# Patient Record
Sex: MALE | Race: WHITE | URBAN - METROPOLITAN AREA
[De-identification: names, ages, dates, MRNs, and addresses within clinical notes are randomized per-mention and may not be internally consistent; named-entity substitution may affect disease eponyms.]

---

## 2020-01-06 ENCOUNTER — OFFICE VISIT (OUTPATIENT)
Dept: URBAN - METROPOLITAN AREA CLINIC 52 | Facility: CLINIC | Age: 70
End: 2020-01-06
Payer: MEDICARE

## 2020-01-06 DIAGNOSIS — H11.002 PTERYGIUM OF LT EYE: ICD-10-CM

## 2020-01-06 DIAGNOSIS — H25.013 CORTICAL AGE-RELATED CATARACT, BILATERAL: ICD-10-CM

## 2020-01-06 DIAGNOSIS — E11.9 TYPE 2 DIABETES MELLITUS WITHOUT COMPLICATIONS: Primary | ICD-10-CM

## 2020-01-06 DIAGNOSIS — H11.001 PTERYGIUM OF RIGHT EYE: ICD-10-CM

## 2020-01-06 PROCEDURE — 99204 OFFICE O/P NEW MOD 45 MIN: CPT | Performed by: OPHTHALMOLOGY

## 2020-01-06 ASSESSMENT — INTRAOCULAR PRESSURE
OD: 18
OS: 17

## 2020-01-06 ASSESSMENT — VISUAL ACUITY
OD: 20/50
OS: 20/50

## 2020-01-06 NOTE — IMPRESSION/PLAN
Impression: Cortical age-related cataract, bilateral: H25.013. Plan: OK for ce/iol OS then OD in [facility] 26, RL2. Distance target. Discussed ORA. Discussed lens options, Standard. Pt may be a candidate for premium lens. Discussed need for glasses for near vision with standard. Discussed diagnosis of cataracts. Cataracts are limiting vision. Discussed risks, benefits and alternatives to surgery including but not limited to: bleeding, infection, risk of vision loss, loss of the eye, need for other surgery. Patient voiced understanding and wishes to proceed.  Cat surgery AFTER pterygium surg

## 2020-01-06 NOTE — IMPRESSION/PLAN
Impression: Pterygium of lt eye: H11.002. Plan: Discussed diagnosis in detail with patient. Discussed treatment options with patient.  Consult recommended, Dr Alie Horowitz for removal
Cat surg to follow pterygium surgery by 3-6 months

## 2020-01-06 NOTE — IMPRESSION/PLAN
Impression: Type 2 diabetes mellitus without complications: X74.6. Plan: Diabetes type II: no background retinopathy, no signs of neovascularization noted. Discussed ocular and systemic benefits of blood sugar control.